# Patient Record
Sex: MALE | Race: WHITE | NOT HISPANIC OR LATINO
[De-identification: names, ages, dates, MRNs, and addresses within clinical notes are randomized per-mention and may not be internally consistent; named-entity substitution may affect disease eponyms.]

---

## 2018-06-25 PROBLEM — Z00.00 ENCOUNTER FOR PREVENTIVE HEALTH EXAMINATION: Status: ACTIVE | Noted: 2018-06-25

## 2018-07-25 ENCOUNTER — RECORD ABSTRACTING (OUTPATIENT)
Age: 63
End: 2018-07-25

## 2018-07-25 ENCOUNTER — APPOINTMENT (OUTPATIENT)
Dept: CARDIOLOGY | Facility: CLINIC | Age: 63
End: 2018-07-25
Payer: COMMERCIAL

## 2018-07-25 ENCOUNTER — NON-APPOINTMENT (OUTPATIENT)
Age: 63
End: 2018-07-25

## 2018-07-25 VITALS
HEART RATE: 62 BPM | SYSTOLIC BLOOD PRESSURE: 152 MMHG | WEIGHT: 155 LBS | HEIGHT: 66 IN | DIASTOLIC BLOOD PRESSURE: 90 MMHG | BODY MASS INDEX: 24.91 KG/M2

## 2018-07-25 VITALS — SYSTOLIC BLOOD PRESSURE: 142 MMHG | DIASTOLIC BLOOD PRESSURE: 84 MMHG

## 2018-07-25 DIAGNOSIS — Z78.9 OTHER SPECIFIED HEALTH STATUS: ICD-10-CM

## 2018-07-25 DIAGNOSIS — Z82.49 FAMILY HISTORY OF ISCHEMIC HEART DISEASE AND OTHER DISEASES OF THE CIRCULATORY SYSTEM: ICD-10-CM

## 2018-07-25 PROCEDURE — 99244 OFF/OP CNSLTJ NEW/EST MOD 40: CPT

## 2018-07-25 PROCEDURE — 93000 ELECTROCARDIOGRAM COMPLETE: CPT

## 2018-08-20 ENCOUNTER — APPOINTMENT (OUTPATIENT)
Dept: CARDIOLOGY | Facility: CLINIC | Age: 63
End: 2018-08-20
Payer: COMMERCIAL

## 2018-08-20 PROCEDURE — 93880 EXTRACRANIAL BILAT STUDY: CPT

## 2018-08-20 PROCEDURE — 93015 CV STRESS TEST SUPVJ I&R: CPT

## 2018-08-20 PROCEDURE — 93306 TTE W/DOPPLER COMPLETE: CPT

## 2018-09-06 ENCOUNTER — APPOINTMENT (OUTPATIENT)
Dept: CARDIOLOGY | Facility: CLINIC | Age: 63
End: 2018-09-06
Payer: COMMERCIAL

## 2018-09-06 VITALS
SYSTOLIC BLOOD PRESSURE: 140 MMHG | HEART RATE: 58 BPM | DIASTOLIC BLOOD PRESSURE: 82 MMHG | BODY MASS INDEX: 24.91 KG/M2 | WEIGHT: 155 LBS | HEIGHT: 66 IN

## 2018-09-06 DIAGNOSIS — I34.0 NONRHEUMATIC MITRAL (VALVE) INSUFFICIENCY: ICD-10-CM

## 2018-09-06 DIAGNOSIS — R03.0 ELEVATED BLOOD-PRESSURE READING, W/OUT DIAGNOSIS OF HYPERTENSION: ICD-10-CM

## 2018-09-06 PROCEDURE — 99214 OFFICE O/P EST MOD 30 MIN: CPT

## 2018-09-06 RX ORDER — GLUC/MSM/COLGN2/HYAL/ANTIARTH3 375-375-20
TABLET ORAL
Refills: 0 | Status: ACTIVE | COMMUNITY

## 2018-11-19 ENCOUNTER — APPOINTMENT (OUTPATIENT)
Dept: CARDIOLOGY | Facility: CLINIC | Age: 63
End: 2018-11-19
Payer: COMMERCIAL

## 2018-11-19 PROCEDURE — 93308 TTE F-UP OR LMTD: CPT

## 2018-11-29 ENCOUNTER — TRANSCRIPTION ENCOUNTER (OUTPATIENT)
Age: 63
End: 2018-11-29

## 2018-11-29 ENCOUNTER — APPOINTMENT (OUTPATIENT)
Dept: CARDIOLOGY | Facility: CLINIC | Age: 63
End: 2018-11-29
Payer: COMMERCIAL

## 2018-11-29 VITALS
WEIGHT: 155 LBS | BODY MASS INDEX: 24.91 KG/M2 | DIASTOLIC BLOOD PRESSURE: 64 MMHG | HEIGHT: 66 IN | SYSTOLIC BLOOD PRESSURE: 110 MMHG | OXYGEN SATURATION: 98 % | HEART RATE: 55 BPM

## 2018-11-29 PROCEDURE — 99213 OFFICE O/P EST LOW 20 MIN: CPT

## 2018-11-30 NOTE — PHYSICAL EXAM
[General Appearance - Well Developed] : well developed [Normal Appearance] : normal appearance [Well Groomed] : well groomed [General Appearance - Well Nourished] : well nourished [No Deformities] : no deformities [General Appearance - In No Acute Distress] : no acute distress [Normal Conjunctiva] : the conjunctiva exhibited no abnormalities [Eyelids - No Xanthelasma] : the eyelids demonstrated no xanthelasmas [Normal Oral Mucosa] : normal oral mucosa [No Oral Pallor] : no oral pallor [No Oral Cyanosis] : no oral cyanosis [Normal Jugular Venous A Waves Present] : normal jugular venous A waves present [Normal Jugular Venous V Waves Present] : normal jugular venous V waves present [No Jugular Venous Ballesteros A Waves] : no jugular venous ballesteros A waves [Respiration, Rhythm And Depth] : normal respiratory rhythm and effort [Exaggerated Use Of Accessory Muscles For Inspiration] : no accessory muscle use [Auscultation Breath Sounds / Voice Sounds] : lungs were clear to auscultation bilaterally [Heart Rate And Rhythm] : heart rate and rhythm were normal [Heart Sounds] : normal S1 and S2 [Murmurs] : no murmurs present [Abdomen Soft] : soft [Abdomen Tenderness] : non-tender [Abdomen Mass (___ Cm)] : no abdominal mass palpated [Abnormal Walk] : normal gait [Gait - Sufficient For Exercise Testing] : the gait was sufficient for exercise testing [Nail Clubbing] : no clubbing of the fingernails [Cyanosis, Localized] : no localized cyanosis [Petechial Hemorrhages (___cm)] : no petechial hemorrhages [Skin Color & Pigmentation] : normal skin color and pigmentation [] : no rash [No Venous Stasis] : no venous stasis [Skin Lesions] : no skin lesions [No Skin Ulcers] : no skin ulcer [No Xanthoma] : no  xanthoma was observed [Oriented To Time, Place, And Person] : oriented to person, place, and time [Affect] : the affect was normal [Mood] : the mood was normal [No Anxiety] : not feeling anxious [FreeTextEntry1] : decr upstroke

## 2018-11-30 NOTE — HISTORY OF PRESENT ILLNESS
[FreeTextEntry1] : IHSAN GAUTAM  is a 63 year old  M\par \par h/o HL, HTN, pericardial effusion, borderline EKG, atherosclerosis, VHD\par \par Physically active. Exercises on treadmill and bicycle. \par No limitations. \par \par There is no exertional chest pain, pressure or discomfort. \par There is no orthopnea. \par There are no symptomatic palpitations, lightheadedness, dizziness or syncope.\par \par Blood pressure typically 130s over 80s. \par \par There is a history of hyperlipidemia.\par Maintained on low-dose preventive statin therapy. \par \par There is no prior history of a clinical myocardial infarction, coronary revascularization. \par There is no history of symptomatic congestive heart failure rheumatic heart disease or valvular disease.\par There is no history of symptomatic arrhythmias including atrial fibrillation.\par \par Carotid Doppler August 2018. Mild, nonobstructive atherosclerosis. No stenosis. \par Echocardiogram. August 2018. Normal left ventricular function. Mild to moderate mitral regurgitation. Left ventricular hypertrophy.\par Recent echocardiogram demonstrates normal left ventricular function with a trace pericardial effusion. \par Exercise tolerance test. Exercise 9 minutes. Baseline blood pressure 130/90. No ischemia. \par  \par Blood work march 2018. potassium 4.5, creatinine 0.9, total chol 169, ldl 70, hemoglobin 15.3, tsh 1.7. \par Labs Hgb 14.9, creatinine 0.9, LDL 80. \par \par EKG demonstrates normal sinus rhythm with poor R wave progression. \par \par Lives in [place], works in the arts.

## 2018-11-30 NOTE — ASSESSMENT
[FreeTextEntry1] : h/o HL, HTN, borderline EKG\par Hypertensive heart disease.\par Subclinical atherosclerosis.\par No ischemic heart disease, LV dysfunction. \par \par Discussed the diagnosis, natural history and pathophysiology of HTN.\par Discussed importance of long term BP control to reduce CV risk\par Reviewed recent guidelines and BP goals\par Discussed indications for pharmacotherapy.\par Start losartan. \par Clinical improvement in blood pressure.\par Monitor blood pressure at home.\par Monitor electrolytes and renal function. \par Call if adverse effects\par Lifestyle measures: regular aerobic exercise, low-sodium diet, limit NSAIDs\par Continue statin\par \par Small pericardial effusion. \par He does report he had a viral illness at that time. \par There has been clinical improvement. \par Limited echocardiogram. \par If persistent, then secondary evaluation.\par  \par Valvular heart disease\par Reviewed echo\par No chamber dilatation\par No CHF\par Surveillance monitoring\par Does not require invasive rx\par Does not require SBE ppx\par

## 2019-03-07 ENCOUNTER — APPOINTMENT (OUTPATIENT)
Dept: CARDIOLOGY | Facility: CLINIC | Age: 64
End: 2019-03-07
Payer: COMMERCIAL

## 2019-03-07 VITALS
BODY MASS INDEX: 25.34 KG/M2 | DIASTOLIC BLOOD PRESSURE: 64 MMHG | HEART RATE: 68 BPM | OXYGEN SATURATION: 98 % | WEIGHT: 157 LBS | SYSTOLIC BLOOD PRESSURE: 114 MMHG

## 2019-03-07 PROCEDURE — 99214 OFFICE O/P EST MOD 30 MIN: CPT

## 2019-03-07 RX ORDER — PSYLLIUM HUSK 0.4 G
CAPSULE ORAL
Refills: 0 | Status: ACTIVE | COMMUNITY

## 2019-03-07 NOTE — ASSESSMENT
[FreeTextEntry1] : h/o HL, HTN, borderline EKG\par Hypertensive heart disease.\par Subclinical atherosclerosis.\par No ischemic heart disease, LV dysfunction. \par \par Recent palpitations, without associated symptoms. Occurs multiple times throughout the day. Does not occur with exertion. Denies chest discomfort or shortness of breath. Recommend 24-hour Holter monitor. Repeat labs to include CBC, CMP, magnesium, thyroid profile. Avoid substances to include caffeine and alcohol or stimulants.\par \par Understand the diagnosis, natural history and pathophysiology of HTN.\par On losartan, with clinical improvement in blood pressure.\par Monitor blood pressure at home.\par Monitor electrolytes and renal function, due for repeat labs.\par \par Lifestyle measures: regular aerobic exercise, low-sodium diet, limit NSAIDs\par Continue statin\par \par Small pericardial effusion, now trace. \par He does report he had a viral illness at that time. \par There has been clinical improvement. \par \par  \par Valvular heart disease\par Reviewed echo\par No chamber dilatation\par No CHF\par Surveillance monitoring\par Does not require invasive rx\par Does not require SBE ppx\par

## 2019-03-07 NOTE — HISTORY OF PRESENT ILLNESS
[FreeTextEntry1] : IHSAN GAUTAM  is a 63 year old  M\par \par h/o HL, HTN, pericardial effusion, borderline EKG, atherosclerosis, VHD\par \par Physically active. Exercises on treadmill and bicycle. \par No limitations. \par \par There is no exertional chest pain, pressure or discomfort. \par There is no orthopnea. \par There are recent palpitations brief, vibratory sensation, without associated lightheadedness, dizziness or syncope.\par \par Blood pressure typically 130s over 80s. \par \par There is a history of hyperlipidemia.\par Maintained on low-dose preventive statin therapy. \par \par There is no prior history of a clinical myocardial infarction, coronary revascularization. \par There is no history of symptomatic congestive heart failure rheumatic heart disease or valvular disease.\par There is no history of symptomatic arrhythmias including atrial fibrillation.\par \par Carotid Doppler August 2018. Mild, nonobstructive atherosclerosis. No stenosis. \par Echocardiogram. August 2018. Normal left ventricular function. Mild to moderate mitral regurgitation. Left ventricular hypertrophy.\par Recent echocardiogram 11-18 demonstrates normal left ventricular function with a trace pericardial effusion. \par Exercise tolerance test. Exercise 9 minutes. Baseline blood pressure 130/90. No ischemia. \par  \par Blood work march 2018. potassium 4.5, creatinine 0.9, total chol 169, ldl 70, hemoglobin 15.3, tsh 1.7. \par Labs Hgb 14.9, creatinine 0.9, LDL 80. \par \par EKG demonstrates normal sinus rhythm with poor R wave progression. \par \par Lives in [place], works in the arts.

## 2019-03-07 NOTE — HISTORY OF PRESENT ILLNESS
[FreeTextEntry1] : IHSAN GAUTAM  is a 63 year old  M\par \par h/o HL, HTN, pericardial effusion, borderline EKG, atherosclerosis, VHD\par \par Physically active. Exercises on treadmill and bicycle. \par No limitations. \par \par There is no exertional chest pain, pressure or discomfort. \par There is no orthopnea. \par There are recent palpitations brief, vibratory sensation, without associated lightheadedness, dizziness or syncope.\par \par Blood pressure typically 130s over 80s. \par \par There is a history of hyperlipidemia.\par Maintained on low-dose preventive statin therapy. \par \par There is no prior history of a clinical myocardial infarction, coronary revascularization. \par There is no history of symptomatic congestive heart failure rheumatic heart disease or valvular disease.\par There is no history of symptomatic arrhythmias including atrial fibrillation.\par \par Carotid Doppler August 2018. Mild, nonobstructive atherosclerosis. No stenosis. \par Echocardiogram. August 2018. Normal left ventricular function. Mild to moderate mitral regurgitation. Left ventricular hypertrophy.\par Recent echocardiogram 11-18 demonstrates normal left ventricular function with a trace pericardial effusion. \par Exercise tolerance test. Exercise 9 minutes. Baseline blood pressure 130/90. No ischemia. \par  \par Blood work march 2018. potassium 4.5, creatinine 0.9, total chol 169, ldl 70, hemoglobin 15.3, tsh 1.7. \par Labs Hgb 14.9, creatinine 0.9, LDL 80. \par \par EKG demonstrates normal sinus rhythm with poor R wave progression. \par \par Lives in [place], works in the arts.

## 2019-03-12 PROCEDURE — 93224 XTRNL ECG REC UP TO 48 HRS: CPT

## 2019-04-04 ENCOUNTER — APPOINTMENT (OUTPATIENT)
Dept: CARDIOLOGY | Facility: CLINIC | Age: 64
End: 2019-04-04
Payer: COMMERCIAL

## 2019-04-04 VITALS
SYSTOLIC BLOOD PRESSURE: 110 MMHG | DIASTOLIC BLOOD PRESSURE: 70 MMHG | OXYGEN SATURATION: 99 % | HEART RATE: 58 BPM | BODY MASS INDEX: 25.34 KG/M2 | WEIGHT: 157 LBS

## 2019-04-04 PROCEDURE — 99214 OFFICE O/P EST MOD 30 MIN: CPT

## 2019-04-05 NOTE — ADDENDUM
[FreeTextEntry1] : Please note the patient was seen and examined with YADY Guzman.\rodrigue I was physically present during the service of the patient and personally examined the patient.\rodrigue I was directly involved in the management plan and recommendations of care provided to the patient. \rodrigue I personally reviewed the history and physical exam examination as documented by the PA above.\par

## 2019-04-05 NOTE — ASSESSMENT
[FreeTextEntry1] : h/o HL, HTN, borderline EKG\par Hypertensive heart disease.\par Subclinical atherosclerosis.\par No ischemic heart disease, LV dysfunction. \par \par Recent palpitations, without associated symptoms.  This has nearly resolved since our last visit. Benign HM findings.  Repeat labs to include CBC, CMP, magnesium, thyroid profile were normal.  Avoid substances to include caffeine and alcohol or stimulants.  I  offered him longer mobile telemetry, however, given the infrequency of symptoms, he defers for now. I asked him to contact me should he have progression in his symptoms. \par \par Understand the diagnosis, natural history and pathophysiology of HTN.\par On losartan, with clinical improvement in blood pressure.\par Monitor blood pressure at home.\par \par \par Lifestyle measures: regular aerobic exercise, low-sodium diet, limit NSAIDs\par Continue statin\par \par Small pericardial effusion, now trace. \par He does report he had a viral illness at that time. \par There has been clinical improvement. \par \par  \par Valvular heart disease\par Reviewed echo\par No chamber dilatation\par No CHF\par Surveillance monitoring\par Does not require invasive rx\par Does not require SBE ppx\par \par \par 6 months follow up or as needed.

## 2019-04-05 NOTE — HISTORY OF PRESENT ILLNESS
[FreeTextEntry1] : IHSAN GAUTAM  is a 63 year old  M\par \par h/o HL, HTN, pericardial effusion, borderline EKG, atherosclerosis, VHD\par \par Physically active. Exercises on treadmill and bicycle. \par No limitations. \par \par There is no exertional chest pain, pressure or discomfort. \par There is no orthopnea. \par At the last office visit he complained of palpitations brief, vibratory sensation, without associated lightheadedness, dizziness or syncope.\par Palpitations improved dramatically are now rare. \par Blood pressure typically 130s over 80s. \par \par There is a history of hyperlipidemia.\par Maintained on low-dose preventive statin therapy. \par \par There is no prior history of a clinical myocardial infarction, coronary revascularization. \par There is no history of symptomatic congestive heart failure rheumatic heart disease or valvular disease.\par There is no history of symptomatic arrhythmias including atrial fibrillation.\par \par Holter monitor was applied on March 7, 2019, basic rhythm is sinus, minimal rate of 50 and maximum of 118. There were rare PACs and rare PVCs. The patient did not experience palpitations during the monitoring period.\par \par Lab testing on March 29, 2019 revealed normal competent metabolic profile, magnesium level, CBC and TSH.\par \par \par Carotid Doppler August 2018. Mild, nonobstructive atherosclerosis. No stenosis. \par Echocardiogram. August 2018. Normal left ventricular function. Mild to moderate mitral regurgitation. Left ventricular hypertrophy.\par Recent echocardiogram 11-18 demonstrates normal left ventricular function with a trace pericardial effusion. \par Exercise tolerance test. Exercise 9 minutes. Baseline blood pressure 130/90. No ischemia. \par  \par Blood work march 2018. potassium 4.5, creatinine 0.9, total chol 169, ldl 70, hemoglobin 15.3, tsh 1.7. \par Labs Hgb 14.9, creatinine 0.9, LDL 80. \par \par EKG demonstrates normal sinus rhythm with poor R wave progression. \par \par Lives in [place], works in the arts.

## 2019-06-12 ENCOUNTER — APPOINTMENT (OUTPATIENT)
Dept: CARDIOLOGY | Facility: CLINIC | Age: 64
End: 2019-06-12

## 2019-09-02 PROBLEM — I34.0 NON-RHEUMATIC MITRAL REGURGITATION: Status: ACTIVE | Noted: 2018-09-06

## 2019-10-10 ENCOUNTER — APPOINTMENT (OUTPATIENT)
Dept: CARDIOLOGY | Facility: CLINIC | Age: 64
End: 2019-10-10

## 2020-02-19 ENCOUNTER — NON-APPOINTMENT (OUTPATIENT)
Age: 65
End: 2020-02-19

## 2020-02-19 ENCOUNTER — APPOINTMENT (OUTPATIENT)
Dept: CARDIOLOGY | Facility: CLINIC | Age: 65
End: 2020-02-19
Payer: COMMERCIAL

## 2020-02-19 VITALS
HEART RATE: 71 BPM | BODY MASS INDEX: 24.91 KG/M2 | DIASTOLIC BLOOD PRESSURE: 70 MMHG | HEIGHT: 66 IN | OXYGEN SATURATION: 96 % | WEIGHT: 155 LBS | SYSTOLIC BLOOD PRESSURE: 134 MMHG

## 2020-02-19 PROCEDURE — 99214 OFFICE O/P EST MOD 30 MIN: CPT

## 2020-02-19 PROCEDURE — 93000 ELECTROCARDIOGRAM COMPLETE: CPT

## 2020-02-19 NOTE — PHYSICAL EXAM
[General Appearance - Well Developed] : well developed [Normal Appearance] : normal appearance [Well Groomed] : well groomed [General Appearance - In No Acute Distress] : no acute distress [No Deformities] : no deformities [General Appearance - Well Nourished] : well nourished [Normal Conjunctiva] : the conjunctiva exhibited no abnormalities [Normal Oral Mucosa] : normal oral mucosa [Eyelids - No Xanthelasma] : the eyelids demonstrated no xanthelasmas [No Oral Pallor] : no oral pallor [No Oral Cyanosis] : no oral cyanosis [Normal Jugular Venous A Waves Present] : normal jugular venous A waves present [Normal Jugular Venous V Waves Present] : normal jugular venous V waves present [No Jugular Venous Ballesteros A Waves] : no jugular venous ballesteros A waves [Respiration, Rhythm And Depth] : normal respiratory rhythm and effort [] : no respiratory distress [Auscultation Breath Sounds / Voice Sounds] : lungs were clear to auscultation bilaterally [Exaggerated Use Of Accessory Muscles For Inspiration] : no accessory muscle use [Heart Rate And Rhythm] : heart rate and rhythm were normal [Murmurs] : no murmurs present [Heart Sounds] : normal S1 and S2

## 2020-02-19 NOTE — HISTORY OF PRESENT ILLNESS
[FreeTextEntry1] : The patient has excellent exercise ability without exertional symptoms he runs for 15-30 minutes without trouble.\par \par Hyperlipidemia: The patient is tolerating statin therapy. LFTs and cholesterol profile have been arranged.\par \par Hypertension: Well controlled.\par \par Shortness of breath: None recently.\par \par Pericardial effusion: Trace, asymptomatic.

## 2021-02-11 ENCOUNTER — TRANSCRIPTION ENCOUNTER (OUTPATIENT)
Age: 66
End: 2021-02-11

## 2021-02-23 ENCOUNTER — TRANSCRIPTION ENCOUNTER (OUTPATIENT)
Age: 66
End: 2021-02-23

## 2021-02-28 ENCOUNTER — TRANSCRIPTION ENCOUNTER (OUTPATIENT)
Age: 66
End: 2021-02-28

## 2021-05-07 ENCOUNTER — TRANSCRIPTION ENCOUNTER (OUTPATIENT)
Age: 66
End: 2021-05-07

## 2021-07-06 ENCOUNTER — RX RENEWAL (OUTPATIENT)
Age: 66
End: 2021-07-06

## 2021-07-06 RX ORDER — ATORVASTATIN CALCIUM 10 MG/1
10 TABLET, FILM COATED ORAL DAILY
Qty: 90 | Refills: 1 | Status: ACTIVE | COMMUNITY
Start: 2020-12-04 | End: 1900-01-01

## 2021-07-14 ENCOUNTER — TRANSCRIPTION ENCOUNTER (OUTPATIENT)
Age: 66
End: 2021-07-14

## 2021-08-15 ENCOUNTER — TRANSCRIPTION ENCOUNTER (OUTPATIENT)
Age: 66
End: 2021-08-15

## 2021-10-08 ENCOUNTER — TRANSCRIPTION ENCOUNTER (OUTPATIENT)
Age: 66
End: 2021-10-08

## 2021-10-28 ENCOUNTER — NON-APPOINTMENT (OUTPATIENT)
Age: 66
End: 2021-10-28

## 2021-10-28 ENCOUNTER — APPOINTMENT (OUTPATIENT)
Dept: CARDIOLOGY | Facility: CLINIC | Age: 66
End: 2021-10-28
Payer: MEDICARE

## 2021-10-28 VITALS
HEIGHT: 66 IN | TEMPERATURE: 97 F | BODY MASS INDEX: 25.23 KG/M2 | SYSTOLIC BLOOD PRESSURE: 124 MMHG | WEIGHT: 157 LBS | DIASTOLIC BLOOD PRESSURE: 86 MMHG | HEART RATE: 73 BPM | OXYGEN SATURATION: 96 %

## 2021-10-28 PROCEDURE — 99213 OFFICE O/P EST LOW 20 MIN: CPT

## 2021-10-28 PROCEDURE — 93000 ELECTROCARDIOGRAM COMPLETE: CPT

## 2021-10-28 NOTE — HISTORY OF PRESENT ILLNESS
[FreeTextEntry1] : Hypertension: Well-controlled.  The patient has excellent exercise capacity without exertional symptoms.\par \par Pericardial effusion: Most recent echocardiogram revealed trace pericardial effusion.  The patient is asymptomatic.  Continued observation is best option.

## 2021-11-30 ENCOUNTER — APPOINTMENT (OUTPATIENT)
Dept: CARDIOLOGY | Facility: CLINIC | Age: 66
End: 2021-11-30
Payer: MEDICARE

## 2021-11-30 VITALS
BODY MASS INDEX: 25.88 KG/M2 | WEIGHT: 161 LBS | HEIGHT: 66 IN | HEART RATE: 75 BPM | OXYGEN SATURATION: 96 % | TEMPERATURE: 97.3 F | SYSTOLIC BLOOD PRESSURE: 130 MMHG | DIASTOLIC BLOOD PRESSURE: 76 MMHG

## 2021-11-30 PROCEDURE — 99214 OFFICE O/P EST MOD 30 MIN: CPT

## 2021-11-30 NOTE — ASSESSMENT
[FreeTextEntry1] : Pericardial effusion: The patient has excellent exercise capacity without exertional symptoms. Repeat echocardiography is indicated to assess for worsening of his pericardial effusion.\par \par Carotid artery disease: There is been no neurologic symptoms. Patient has known carotid artery disease by carotid duplex in the past. Repeat carotid duplex has been arranged as requested from his primary care doctor.\par \par Hyperlipidemia: The patient is on statin therapy. The patient reports that he was advised elsewhere that his cholesterol is in good control.\par \par Overall we will continue aspirin therapy at this time.

## 2021-12-07 RX ORDER — ASPIRIN 81 MG
81 TABLET, DELAYED RELEASE (ENTERIC COATED) ORAL DAILY
Refills: 0 | Status: ACTIVE | COMMUNITY

## 2021-12-28 ENCOUNTER — APPOINTMENT (OUTPATIENT)
Dept: CARDIOLOGY | Facility: CLINIC | Age: 66
End: 2021-12-28
Payer: MEDICARE

## 2021-12-28 PROCEDURE — 93880 EXTRACRANIAL BILAT STUDY: CPT

## 2021-12-28 PROCEDURE — 93306 TTE W/DOPPLER COMPLETE: CPT

## 2021-12-30 ENCOUNTER — TRANSCRIPTION ENCOUNTER (OUTPATIENT)
Age: 66
End: 2021-12-30

## 2022-01-12 ENCOUNTER — APPOINTMENT (OUTPATIENT)
Dept: CARDIOLOGY | Facility: CLINIC | Age: 67
End: 2022-01-12
Payer: MEDICARE

## 2022-01-12 VITALS
HEART RATE: 63 BPM | HEIGHT: 66 IN | SYSTOLIC BLOOD PRESSURE: 118 MMHG | TEMPERATURE: 97.1 F | BODY MASS INDEX: 26.03 KG/M2 | OXYGEN SATURATION: 98 % | WEIGHT: 162 LBS | DIASTOLIC BLOOD PRESSURE: 72 MMHG

## 2022-01-12 PROCEDURE — 99214 OFFICE O/P EST MOD 30 MIN: CPT

## 2022-01-12 NOTE — HISTORY OF PRESENT ILLNESS
[FreeTextEntry1] : Pericardial effusion: Essentially no change since 2018.  In the interim he has had CBC chemistry profile thyroid function testing and CT scanning of the chest.  None of these revealed an etiology to his pericardial effusion.  Overall continued observation is his best option.  The patient is asymptomatic in this regard.\par \par Hyperlipidemia: The patient is maintained on statin therapy.  He states his cholesterol was checked through his primary care doctor's office.  Given his evidence of carotid artery disease would aim to achieve a target LDL of under 100.\par \par Carotid artery disease: No neurologic symptoms.  Continue aspirin and statin therapy.\par \par Possible granuloma on CT of the chest.  The patient has been advised to follow-up with pulmonary in this regard.\par \par Incidental adrenal findings: The patient has been advised to follow-up with primary care in this regard.

## 2022-02-06 ENCOUNTER — TRANSCRIPTION ENCOUNTER (OUTPATIENT)
Age: 67
End: 2022-02-06

## 2022-06-21 ENCOUNTER — NON-APPOINTMENT (OUTPATIENT)
Age: 67
End: 2022-06-21

## 2023-01-24 ENCOUNTER — NON-APPOINTMENT (OUTPATIENT)
Age: 68
End: 2023-01-24

## 2023-01-24 ENCOUNTER — APPOINTMENT (OUTPATIENT)
Dept: CARDIOLOGY | Facility: CLINIC | Age: 68
End: 2023-01-24
Payer: MEDICARE

## 2023-01-24 VITALS
HEART RATE: 72 BPM | TEMPERATURE: 96.9 F | BODY MASS INDEX: 26.03 KG/M2 | SYSTOLIC BLOOD PRESSURE: 128 MMHG | DIASTOLIC BLOOD PRESSURE: 80 MMHG | OXYGEN SATURATION: 97 % | WEIGHT: 162 LBS | HEIGHT: 66 IN

## 2023-01-24 DIAGNOSIS — I65.29 OCCLUSION AND STENOSIS OF UNSPECIFIED CAROTID ARTERY: ICD-10-CM

## 2023-01-24 PROCEDURE — 99213 OFFICE O/P EST LOW 20 MIN: CPT

## 2023-01-24 NOTE — ASSESSMENT
[FreeTextEntry1] : Pericardial effusion: No change for several years.  CBC chemistry profile thyroid function testing and CT of the scanning are unrevealing.  Continued observation is best option.  The patient is asymptomatic.\par \par Hyperlipidemia: Continue statin therapy.\par \par Carotid atherosclerosis: No clinical events.

## 2023-03-16 ENCOUNTER — NON-APPOINTMENT (OUTPATIENT)
Age: 68
End: 2023-03-16

## 2023-03-16 ENCOUNTER — APPOINTMENT (OUTPATIENT)
Dept: CARDIOLOGY | Facility: CLINIC | Age: 68
End: 2023-03-16
Payer: MEDICARE

## 2023-03-16 VITALS
TEMPERATURE: 97 F | SYSTOLIC BLOOD PRESSURE: 126 MMHG | DIASTOLIC BLOOD PRESSURE: 82 MMHG | OXYGEN SATURATION: 96 % | HEART RATE: 76 BPM | HEIGHT: 66 IN | BODY MASS INDEX: 26.03 KG/M2 | WEIGHT: 162 LBS

## 2023-03-16 DIAGNOSIS — I49.8 OTHER SPECIFIED CARDIAC ARRHYTHMIAS: ICD-10-CM

## 2023-03-16 PROCEDURE — 99214 OFFICE O/P EST MOD 30 MIN: CPT

## 2023-03-16 NOTE — HISTORY OF PRESENT ILLNESS
[FreeTextEntry1] : Palpitations: Recently the patient developed palpitation syndrome.  Describes a feeling like a cat is purring on his chest.  It occurs nearly all day long for several days.  There is been no syncope.  There is been no near syncope.  3-day event monitoring has been arranged to evaluate for dysrhythmia.\par \par Chest Discomfort: The purring is uncomfortable-like feeling.  Exercise stress testing has been arranged to rule out ischemia.\par \par Pericardial effusion: Transthoracic echocardiography is indicated to assess for worsening of his effusion.

## 2023-03-20 ENCOUNTER — APPOINTMENT (OUTPATIENT)
Dept: CARDIOLOGY | Facility: CLINIC | Age: 68
End: 2023-03-20
Payer: MEDICARE

## 2023-03-20 PROCEDURE — 93306 TTE W/DOPPLER COMPLETE: CPT

## 2023-03-28 ENCOUNTER — APPOINTMENT (OUTPATIENT)
Dept: CARDIOLOGY | Facility: CLINIC | Age: 68
End: 2023-03-28
Payer: MEDICARE

## 2023-03-28 DIAGNOSIS — R06.02 SHORTNESS OF BREATH: ICD-10-CM

## 2023-03-28 PROCEDURE — 93015 CV STRESS TEST SUPVJ I&R: CPT

## 2023-03-28 PROCEDURE — 93244 EXT ECG>48HR<7D REV&INTERPJ: CPT

## 2023-04-04 ENCOUNTER — APPOINTMENT (OUTPATIENT)
Dept: CARDIOLOGY | Facility: CLINIC | Age: 68
End: 2023-04-04
Payer: MEDICARE

## 2023-04-04 VITALS
HEIGHT: 66 IN | SYSTOLIC BLOOD PRESSURE: 116 MMHG | HEART RATE: 83 BPM | DIASTOLIC BLOOD PRESSURE: 66 MMHG | WEIGHT: 165 LBS | OXYGEN SATURATION: 95 % | BODY MASS INDEX: 26.52 KG/M2

## 2023-04-04 DIAGNOSIS — I10 ESSENTIAL (PRIMARY) HYPERTENSION: ICD-10-CM

## 2023-04-04 DIAGNOSIS — R07.89 OTHER CHEST PAIN: ICD-10-CM

## 2023-04-04 PROCEDURE — 99214 OFFICE O/P EST MOD 30 MIN: CPT

## 2023-04-04 NOTE — ASSESSMENT
[FreeTextEntry1] :  Chest discomfort: The patient had a purring like sensation like a cat was sitting on his chest.  This is now resolved.  Monitoring did not reveal any significant dysrhythmia.  Exercise stress testing was negative for ischemia.  Transthoracic echocardiography revealed normal left ventricular function.\par \par Pericardial effusion: Patient has chronic pericardial effusion.  It is small in nature.  The pericardial effusion has been present for several years.  CBC, thyroid function testing, chemistries and CT scanning of the chest were unrevealing.\par \par Hypertension: Well-controlled.

## 2023-07-16 ENCOUNTER — RX RENEWAL (OUTPATIENT)
Age: 68
End: 2023-07-16

## 2023-12-26 ENCOUNTER — APPOINTMENT (OUTPATIENT)
Dept: CARDIOLOGY | Facility: CLINIC | Age: 68
End: 2023-12-26
Payer: MEDICARE

## 2023-12-26 VITALS
BODY MASS INDEX: 25.02 KG/M2 | WEIGHT: 155 LBS | SYSTOLIC BLOOD PRESSURE: 132 MMHG | HEART RATE: 78 BPM | OXYGEN SATURATION: 95 % | DIASTOLIC BLOOD PRESSURE: 70 MMHG

## 2023-12-26 DIAGNOSIS — E78.5 HYPERLIPIDEMIA, UNSPECIFIED: ICD-10-CM

## 2023-12-26 DIAGNOSIS — I31.39 OTHER PERICARDIAL EFFUSION (NONINFLAMMATORY): ICD-10-CM

## 2023-12-26 PROCEDURE — 99214 OFFICE O/P EST MOD 30 MIN: CPT

## 2023-12-26 NOTE — ASSESSMENT
[FreeTextEntry1] :   Chest discomfort: No recurrence.  Pericardial effusion: Patient is a chronic small pericardial effusion.  He underwent CT scanning to follow-up with some abnormalities on prior CT scanning.  Again detected was a small pericardial effusion.  Has been unchanged for quite some time.  CBC thyroid function testing chemistries CT scanning of the chest have been unrevealing in this regard.  Hypertension: Continue current pharmacologic therapy.  Hyperlipidemia: Continue statin therapy.  Echocardiography,+ CT scan, stress testing reviewed.

## 2024-06-24 RX ORDER — LOSARTAN POTASSIUM 25 MG/1
25 TABLET, FILM COATED ORAL DAILY
Qty: 90 | Refills: 0 | Status: ACTIVE | COMMUNITY
Start: 2018-09-06 | End: 1900-01-01

## 2024-09-10 ENCOUNTER — NON-APPOINTMENT (OUTPATIENT)
Age: 69
End: 2024-09-10

## 2024-09-10 ENCOUNTER — APPOINTMENT (OUTPATIENT)
Dept: CARDIOLOGY | Facility: CLINIC | Age: 69
End: 2024-09-10
Payer: MEDICARE

## 2024-09-10 VITALS
HEART RATE: 70 BPM | OXYGEN SATURATION: 98 % | DIASTOLIC BLOOD PRESSURE: 72 MMHG | SYSTOLIC BLOOD PRESSURE: 120 MMHG | HEIGHT: 66 IN | RESPIRATION RATE: 14 BRPM | WEIGHT: 150 LBS | BODY MASS INDEX: 24.11 KG/M2

## 2024-09-10 DIAGNOSIS — I10 ESSENTIAL (PRIMARY) HYPERTENSION: ICD-10-CM

## 2024-09-10 DIAGNOSIS — E78.5 HYPERLIPIDEMIA, UNSPECIFIED: ICD-10-CM

## 2024-09-10 DIAGNOSIS — I31.39 OTHER PERICARDIAL EFFUSION (NONINFLAMMATORY): ICD-10-CM

## 2024-09-10 DIAGNOSIS — I65.29 OCCLUSION AND STENOSIS OF UNSPECIFIED CAROTID ARTERY: ICD-10-CM

## 2024-09-10 PROCEDURE — 99203 OFFICE O/P NEW LOW 30 MIN: CPT

## 2024-09-10 PROCEDURE — 93000 ELECTROCARDIOGRAM COMPLETE: CPT

## 2024-09-10 NOTE — HISTORY OF PRESENT ILLNESS
[FreeTextEntry1] : Jonah is a 69-year-old male:   History of chronic asymptomatic pericardial effusion:  small pericardial effusion. He underwent CT scanning to follow-up with some abnormalities on prior CT scanning. Again detected was a small pericardial effusion. Has been unchanged for quite some time. CBC thyroid function testing chemistries CT scanning of the chest have been unrevealing in this regard.  Hypertension: Continue current pharmacologic therapy.  Blood pressure is well-controlled  Hyperlipidemia: Continue statin therapy.  LDL was 103 in October 2023.

## 2024-09-10 NOTE — DISCUSSION/SUMMARY
[FreeTextEntry1] : Continue current medications Patient is having fasting lipid profile with primary care in October 2024; he will send us a copy. Dietary restrictions were discussed. Follow-up in 1 year.  Thank you for this referral and allowing me to participate in the care of this patient.  If I can be of any further help or  if you have any questions, please do not hesitate to contact me   Sincerely,  Roland Hill MD, FAC, [facility]

## 2025-09-18 ENCOUNTER — APPOINTMENT (OUTPATIENT)
Dept: CARDIOLOGY | Facility: CLINIC | Age: 70
End: 2025-09-18
Payer: MEDICARE

## 2025-09-18 VITALS
RESPIRATION RATE: 14 BRPM | HEART RATE: 59 BPM | OXYGEN SATURATION: 98 % | DIASTOLIC BLOOD PRESSURE: 84 MMHG | HEIGHT: 66 IN | WEIGHT: 152 LBS | BODY MASS INDEX: 24.43 KG/M2 | SYSTOLIC BLOOD PRESSURE: 124 MMHG

## 2025-09-18 DIAGNOSIS — I65.29 OCCLUSION AND STENOSIS OF UNSPECIFIED CAROTID ARTERY: ICD-10-CM

## 2025-09-18 DIAGNOSIS — I10 ESSENTIAL (PRIMARY) HYPERTENSION: ICD-10-CM

## 2025-09-18 DIAGNOSIS — I34.0 NONRHEUMATIC MITRAL (VALVE) INSUFFICIENCY: ICD-10-CM

## 2025-09-18 DIAGNOSIS — R06.02 SHORTNESS OF BREATH: ICD-10-CM

## 2025-09-18 DIAGNOSIS — I31.39 OTHER PERICARDIAL EFFUSION (NONINFLAMMATORY): ICD-10-CM

## 2025-09-18 DIAGNOSIS — E78.5 HYPERLIPIDEMIA, UNSPECIFIED: ICD-10-CM

## 2025-09-18 LAB — A1CG - A1C WITH ESTIMATED AVERAGE GLUCOSE: 5.3

## 2025-09-18 PROCEDURE — 99214 OFFICE O/P EST MOD 30 MIN: CPT
